# Patient Record
Sex: MALE | Race: WHITE
[De-identification: names, ages, dates, MRNs, and addresses within clinical notes are randomized per-mention and may not be internally consistent; named-entity substitution may affect disease eponyms.]

---

## 2019-10-07 ENCOUNTER — HOSPITAL ENCOUNTER (OUTPATIENT)
Dept: HOSPITAL 25 - OREAST | Age: 64
Discharge: HOME | End: 2019-10-07
Attending: ORTHOPAEDIC SURGERY
Payer: MEDICARE

## 2019-10-07 VITALS — SYSTOLIC BLOOD PRESSURE: 130 MMHG | DIASTOLIC BLOOD PRESSURE: 78 MMHG

## 2019-10-07 DIAGNOSIS — Z88.0: ICD-10-CM

## 2019-10-07 DIAGNOSIS — M17.12: ICD-10-CM

## 2019-10-07 DIAGNOSIS — I10: ICD-10-CM
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
## 2019-10-07 DIAGNOSIS — S83.242A: Primary | ICD-10-CM

## 2019-10-07 DIAGNOSIS — S83.282A: ICD-10-CM
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
## 2019-10-08 NOTE — OP
DATE OF OPERATION:  10/07/19 - [facility]

 

DATE OF BIRTH:  [birth year]

 

SURGEON:  Mauricio Del Valle MD

 

ASSISTANT:  RODERICK Blackmon.  An assistant was needed for the entirety of 
the case due to the size of the patient, to help with positioning, retraction, 
and was utilized throughout all portions of the case.

 

ANESTHESIOLOGIST:  Dr. Hermosillo.

 

ANESTHESIA:  General.

 

PRE-OP DIAGNOSIS:  Left knee medial meniscus tear.

 

POST-OP DIAGNOSIS:  Left knee medial and lateral meniscus tearing with 
osteoarthritis of the knee.

 

OPERATIVE PROCEDURES:

1.  Left knee arthroscopy with partial medial and left partial lateral 
meniscectomy.

2.  Chondroplasty of the medial compartment and lateral compartment.

3.  Synovectomy.

 

COMPLICATIONS:  None.

 

ESTIMATED BLOOD LOSS:  Minimal.

 

INDICATIONS:  Baudilio Morin is a 64-year-old male who has end-stage arthritis 
in his right knee and moderate arthritis in his left knee, who presented with 
persistent pain and catching symptoms.  He did have injections, which did help 
him for a short while, but he had blocked motion.  We discussed how he is a 
candidate for a total knee arthroplasty, but he also may have displaced 
meniscus tear.  We did an MRI that demonstrated the meniscus tear as well as 
some arthritis.  After extensive discussion including the risks and benefits 
including the fact that he could have severe arthritis, which could be causing 
the pain and that he may be a candidate for total knee.  He wanted to try 
arthroscopy to see if he can get good quality of life or he guess he could live 
with the baseline ache but he could not live with the catch and blocked motion.
  After an extensive discussion of risks and benefits of operative versus 
nonoperative treatment, he has elected to proceed with surgical treatment.  
Risks include, but not limited to bleeding, infection; damage to nerves, vessels
, surrounding structures; wound nonhealing, persistent pain, need for surgery, 
scarring, stiffness, incomplete relief of symptoms, risks of anesthesia.

 

DESCRIPTION OF PROCEDURE:  The patient was greeted in the preoperative area by 
the attending surgeon.  Correct extremity was marked and consent was confirmed.
  The patient was then brought back to the operating suite, he was placed in 
supine position on the operating table and then underwent general anesthesia, 
after which he was appropriately positioned on the bed.  An unsterile 
tourniquet was placed high on the proximal thigh.  A lateral post was 
positioned.  The left leg was then prepped and draped in the usual sterile 
fashion, beginning with chlorhexidine soap scrub and alcohol wipe and final 
prep with ChloraPrep.

 

After appropriate surgical pause indicating side, site, procedure, and 
administration of antibiotics, the knee was intra-articularly injected with 1% 
lidocaine.  The anterior lateral port was made sharply with an 11 blade.  Scope 
was introduced into the joint and the joint was examined.  There was abundant 
synovitis and erythema that was noted.  There was obvious arthritis and grade 0 
to 1 change of the patellofemoral joint, but there was obvious synovitis 
anteromedial and laterally.  The medial compartment was examined. There was 
grade 4 changes at the tibial plateau, but grade 2 changes at the medial 
femoral condyle.  There was evidence of a parrot-beak type tear with 
displacement.  The shaver and biters were used to debride the tear back to a 
stable remnant.  A small chondroplasty was done in the medial femoral condyle 
with grade 2 changes.  The ACL and PCL were intact. The knee was placed in 
figure-of-four position.  Lateral compartment was examined. Again, there were 
grade 4 changes of the lateral plateau, but grade 2 changes of the lateral 
femoral condyle with chondrosis and had unstable flaps, which were debrided 
back using shaver.  There was fraying and tearing in the lateral femoral 
meniscal root, this was debrided back using the rafia.  Once this was complete
, the knee was placed in extension.  There was abundant synovitis that was 
debrided back using the rafia as well as electrocautery device used to 
maintain hemostasis. The gutters were visualized and found to be intact.  The 
knee was then thoroughly lavaged and removed of any loose debris found.  Final 
images were obtained.  The wound was copiously irrigated with sterile saline.  
The portals were closed with 3- 0 nylon.  The knee was intra-articularly 
injected with 0.2% ropivacaine and 80 mg of Depo-Medrol.  Sterile dressings 
were applied.  A Cryo/Cuff was applied.  He was awoken from anesthesia and 
transferred to PACU in stable condition.

 

POSTOPERATIVE PLAN:  He will be weightbearing as tolerated, range of motion as 
tolerated with crutches 3 to 5 days.  Discharged on pain medications.  DVT 
prophylaxis was considered, but deferred due to no previous personal or family 
history.  I will see the patient back in 10 to 14 days.

 

 [id number]/[facility] #: [id number]

[email or web address]